# Patient Record
(demographics unavailable — no encounter records)

---

## 2025-03-16 NOTE — HISTORY OF PRESENT ILLNESS
[FreeTextEntry1] : Patient is in office today for an interval assessment. Denies new palpable mass, breast pain, redness on the skin.  Last mammogram and breast MRI performed 12/2024

## 2025-03-16 NOTE — ASSESSMENT
[FreeTextEntry1] : No suspicious findings noted clinically or on recent MRI  Patient advised follow-up MRI in 3 months as per request of radiologist  Total of 20 minutes was spent in consultation